# Patient Record
Sex: MALE | Race: WHITE | NOT HISPANIC OR LATINO | Employment: FULL TIME | URBAN - METROPOLITAN AREA
[De-identification: names, ages, dates, MRNs, and addresses within clinical notes are randomized per-mention and may not be internally consistent; named-entity substitution may affect disease eponyms.]

---

## 2022-04-01 ENCOUNTER — HOSPITAL ENCOUNTER (EMERGENCY)
Facility: HOSPITAL | Age: 67
Discharge: HOME | End: 2022-04-01
Attending: EMERGENCY MEDICINE | Admitting: EMERGENCY MEDICINE

## 2022-04-01 VITALS
OXYGEN SATURATION: 97 % | RESPIRATION RATE: 16 BRPM | TEMPERATURE: 98 F | SYSTOLIC BLOOD PRESSURE: 152 MMHG | WEIGHT: 218 LBS | BODY MASS INDEX: 27.1 KG/M2 | HEART RATE: 57 BPM | DIASTOLIC BLOOD PRESSURE: 80 MMHG | HEIGHT: 75 IN

## 2022-04-01 DIAGNOSIS — T18.128A FOOD IMPACTION OF ESOPHAGUS, INITIAL ENCOUNTER: Primary | ICD-10-CM

## 2022-04-01 PROCEDURE — 99282 EMERGENCY DEPT VISIT SF MDM: CPT

## 2022-04-01 NOTE — ED PROVIDER NOTES
EMERGENCY DEPARTMENT ENCOUNTER    Pt Name: Grey Currie  MRN: [id number]  Pt :   1955  Room Number:  TRI2/T2  Date of encounter:  2022  PCP: System, Provider Not In  ED Provider: Marty Beltran MD    Historian: Patient      HPI:  Chief Complaint: Food lodged in throat        Context: Grey Currie is a 66 y.o. male who presents to the ED c/o sensation of food being lodged in his throat.  The patient was eating pizza roughly an hour prior to coming to the emergency department.  He states that the pizza was overcooked and hard.  He swallowed and felt a stick in his upper throat.  He notes that he had a piece of meat stuck in his throat years ago.  That required EGD and then eventual ballooning of an esophageal stricture.  He has been taking omeprazole since that time.  Today he swallowed quite quickly because his mother was falling and he was trying to grab her while chewing food.  He still has a foreign body sensation in his throat but has been able to consume water.  He has not tried anything heavier than this.      PAST MEDICAL HISTORY  No past medical history on file.      PAST SURGICAL HISTORY  No past surgical history on file.      FAMILY HISTORY  No family history on file.      SOCIAL HISTORY  Social History     Socioeconomic History   • Marital status:          ALLERGIES  Patient has no known allergies.        REVIEW OF SYSTEMS  Review of Systems       All systems reviewed and negative except for those discussed in HPI.       PHYSICAL EXAM    I have reviewed the triage vital signs and nursing notes.    ED Triage Vitals [22 1700]   Temp Heart Rate Resp BP SpO2   98 °F (36.7 °C) 57 16 152/80 97 %      Temp src Heart Rate Source Patient Position BP Location FiO2 (%)   Oral Monitor Sitting Left arm --       Physical Exam  GENERAL:   Appears very pleasant and in no acute distress  HENT: Nares patent.  No evidence of foreign body in the posterior oropharynx.  EYES: No scleral  icterus.  CV: Regular rhythm, regular rate.  No murmurs gallops rubs  RESPIRATORY: Normal effort.  No audible wheezes, rales or rhonchi.  Clear to auscultation in all fields.  ABDOMEN: Soft, nontender to deep palpation  MUSCULOSKELETAL: No deformities.   NEURO: Alert, moves all extremities, follows commands.  SKIN: Warm, dry, no rash visualized.        LAB RESULTS  No results found for this or any previous visit (from the past 24 hour(s)).    If labs were ordered, I independently reviewed the results.        RADIOLOGY  No Radiology Exams Resulted Within Past 24 Hours          PROCEDURES    Procedures    No orders to display       MEDICATIONS GIVEN IN ER    Medications - No data to display      PROGRESS, DATA ANALYSIS, CONSULTS, AND MEDICAL DECISION MAKING    All labs have been independently reviewed by me.  All radiology studies have been reviewed by me and the radiologist dictating the report.   EKG's have been independently viewed and interpreted by me.      Differential diagnoses: Food bolus which became at least partially resolved if not fully resolved prior to emergency department evaluation.           I had the patient swallow water and 2 packages of dionna crackers.  He is able to do this without any increase in discomfort and without any sensation of sticking or nausea.  The patient is embarrassed that he came to the emergency department and appreciates our care.  I have urged him to follow-up with GI as he likely has a stricture again.  I encouraged him to continue his omeprazole and to chew his food quite well prior to ingesting.      AS OF 21:43 EDT VITALS:    BP - 152/80  HR - 57  TEMP - 98 °F (36.7 °C) (Oral)  O2 SATS - 97%                  DIAGNOSIS  Final diagnoses:   Food impaction of esophagus, initial encounter         DISPOSITION  DISCHARGE    Patient discharged in stable condition.    Reviewed implications of results, diagnosis, meds, responsibility to follow up, warning signs and symptoms of  possible worsening, potential complications and reasons to return to ER.    Patient/Family voiced understanding of above instructions.    Discussed plan for discharge, as there is no emergent indication for admission.  Pt/family is agreeable and understands need for follow up and possible repeat testing.  Pt/family is aware that discharge does not mean that nothing is wrong but that it indicates no emergency is currently present that requires admission and they must continue care with follow-up as given below or with a physician of their choice.     FOLLOW-UP  Luis Bennett MD  [address] [address]  [phone]      NEXT AVAILABLE APPOINTMENT - RECHECK OF CONDITION    [facility] Emergency Department  [address] [address] [phone]    IF YOU HAVE ANY CONCERN OF WORSENING CONDITION         Medication List      No changes were made to your prescriptions during this visit.                  Marty Beltran MD  04/01/22 [id number]

## 2022-04-01 NOTE — DISCHARGE INSTRUCTIONS
CHEW WELL.    CONTINUE OMEPRAZOLE.    Please review the medications you are supposed to be taking according to prior physician recommendations. I have not changed your home medications during this visit. If your discharge instructions indicate that I have changed your home medications, this is not the case, and you should disregard. If you have any questions about the medication you should be taking at home, please call your physician.